# Patient Record
(demographics unavailable — no encounter records)

---

## 2025-03-27 NOTE — PHYSICAL EXAM
[de-identified] : well healed scar [de-identified] : no palpable thyroid nodules [Laryngoscopy Performed] : laryngoscopy was performed, see procedure section for findings [Midline] : located in midline position [de-identified] : 1 mm raised lesion posterior to left upper second molar [Normal] : orientation to person, place, and time: normal

## 2025-03-27 NOTE — HISTORY OF PRESENT ILLNESS
[de-identified] : 8 years  s/p thyroid lobectomy for benign disease. feels well on no Synthroid. denies dysphagia, hoarseness or new lesions. no changes medically since last visit. recent sonogram stable.  I have reviewed all old and new data and available images.  shingles and Covid and influenza since last visit.

## 2025-03-27 NOTE — ASSESSMENT
[FreeTextEntry1] : will observe. sonogram next visit.  bloods drawn. to call next week for results. to return earlier if any change.  patient has been given the opportunity to ask questions, and all of the patient's questions have been answered to their satisfaction.  scheduled to see dentist regarding mouth lesion, which may require biopsy, but is not something that I treat.

## 2025-04-21 NOTE — HISTORY OF PRESENT ILLNESS
[de-identified] : 64 year old female presents for evaluation of lesion at left side of mouth for last 4 months. States its at the back of her mouth between her gums and cheek on the left. States its bothersome as it feels like it pokes, is unable to see area but can feel it. States has gotten larger and smaller randomly over course of months. Had singles, influenza A in December, thought it was shingles, but it didnt heal. Dr Velázquez did thyroid. Denies pain, bleeding, or swelling. States mother  of salivary gland cancer. States dentist saw something dark on Xray, has another appointment.   Also with water draining from left nostril 6 months ago.  Denies nasal or sinus surgery. Did have blepharoplasty.   Also with choking on saliva over last few years. Has gotten heartburn in past. Taking omeprazole and famotidine daily. Sleeps sitting up. Follows with GI. Did have swallow study a few years ago which showed hiatal hernia.

## 2025-04-21 NOTE — ASSESSMENT
[FreeTextEntry1] : 64 year old female presents for evaluation of lesion at left side of mouth for last 4 months. On exam, normal appearing mucosa.   discussed options:  1) follow with dental specialist for possible gingival irregularity - already has an appointment  2) CT scan of neck with contrast  Patient elected to get CT scan to further evaluate left upper mouth. Will call with results.   Also with recurring choking sensation. On exam, erythema and edema consistent with mild acid reflux. on lots of reflux treatment already, follows with GI, has had recent swallow studies per pt   discussed options:  1) continue prescribed omeprazole and famotidine, lifestyle management  2) modified barium swallow - had one recently  Will continue to follow with GI.

## 2025-04-21 NOTE — END OF VISIT
[FreeTextEntry3] : I personally saw and examined the patient in detail. I spoke to JENS Vu regarding the assessment and plan of care.  I preformed the procedures and I reviewed the above assessment and plan of care, and agree. I have made changes in changes in the body of the note where appropriate.

## 2025-05-23 NOTE — HISTORY OF PRESENT ILLNESS
[None] : None [FreeTextEntry1] : 64 yr old female presents to establish care for kidney stones. She had routine Abdominal ultrasounds which showed she had kidney stones. First kidney stone was at age 17 years. No previous surgery for kidney stones. She had gross hematuria and left flank pain in February 2025. Renal US showed Non obstructing stones.    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

## 2025-05-23 NOTE — ASSESSMENT
[FreeTextEntry1] : Kidney stones - Renal US shows bilateral Non obstructing stones.   Gross hematuria - Pt states that she has occasional left flank pain followed by gross hematuria since February 2025  dietary counseling - calcium should be about 1000 mg daily (diet +supplements)  plan 1) urine culture, UA, and urine cytology 2) CT Urogram 3) Cysto in the office

## 2025-06-24 NOTE — ASSESSMENT
[FreeTextEntry1] : 64 year old female presents for evaluation of lesion at left side of mouth for last 4 months. On exam, normal appearing mucosa, clear flow b/l glands. No lesions or ulcers seen on exam.  CT scan 05/02/25 did not show any oral masses or abnormalities - discussed with patient no clear lesion visible or palpable to consider biopsy, can follow up when she has a flare up for further evaluation, with Fx of salivary gland cancer. is following up with oral pathology -  already has an appointment   Also with recurring choking sensation. On exam, erythema and edema consistent with minimal acid reflux. on lots of reflux treatment already, follows with GI, has had recent swallow studies per pt  - will continue lifestyle regiment to reduce overproduction of acid and reduce laryngeal reflux including avoiding caffein, alcohol, eating before bed, spicy and fatty foods, and head elevation at night etc. Handout detailing regiment also given - continue prescribed omeprazole and famotidine Will continue to follow with GI.

## 2025-06-24 NOTE — PROCEDURE
[de-identified] : Procedure performed: laryngeal Endoscopy- Diagnostic Pre-op/post op indication: dysphonia Verbal and/or written consent obtained from patient, Patient was unable to cooperate with mirror Scope #: 3, flexible fiber optic telescope used  Scope was introduced through the nose passed on the floor of the nose to the nasopharynx and then followed down the soft palate to the lower pharynx. The tongue Base, Larynx, Hypopharynx were examined. Base of tongue was symmetric, vallecular was clear, epiglottis was not deformed, subglottis/ pyriform and posterior pharyngeal walls were clear. No erythema, edema, pooling of secretions, masses or lesions. Airway patent, no foreign body visualized. Postcricoid area with moderate erythema and mild edema. + intra-artenoid bar. No pooling of secretions. True vocal cords, vestibular folds, ventricles, pyriform sinuses, and aryepiglottic folds appear normal bilaterally. Vocal cords mobile with good contact b/l. .

## 2025-06-24 NOTE — HISTORY OF PRESENT ILLNESS
[de-identified] : 64 year old female presents for evaluation of lesion at left side of mouth for last 4 months. States its at the back of her mouth between her gums and cheek on the left. States its bothersome as it feels like it pokes, is unable to see area but can feel it. States has gotten larger and smaller randomly over course of months. Had singles, influenza A in December, thought it was shingles, but it didnt heal. Dr Velázquez did thyroid. Denies pain, bleeding, or swelling. States mother  of salivary gland cancer. States dentist saw something dark on Xray, has another appointment.   Also with water draining from left nostril 6 months ago.  Denies nasal or sinus surgery. Did have blepharoplasty.   Also with choking on saliva over last few years. Has gotten heartburn in past. Taking omeprazole and famotidine daily. Sleeps sitting up. Follows with GI. Did have swallow study a few years ago which showed hiatal hernia.   [FreeTextEntry1] : patient following up for left sided oral lesion. CT scan results did not show any oral masses or abnormalities. Still concerned about the sore in her mouth, has been coming and going for last 6 months. Denies pain or discomfort. Still pending visit with oral pathologist. Denies facial swelling.   with occasional sensation of mucus stick in her throat, otherwise throat has been ok since last visit.
